# Patient Record
(demographics unavailable — no encounter records)

---

## 2024-11-14 NOTE — DISCUSSION/SUMMARY
[EKG obtained to assist in diagnosis and management of assessed problem(s)] : EKG obtained to assist in diagnosis and management of assessed problem(s) [FreeTextEntry1] : 1.  No additional cardiac testing at this time. 2.  He will follow-up with sleep medicine regarding the issues he is having with his CPAP machine.  I enforced upon him the importance of wearing it.  I have again given him their information so he can call and make an appointment. 3.  Continue current cardiac meds in doses as noted above for his dyslipidemia including atorvastatin 40 mg daily.  4.  Follow-up with his primary for hypothyroidism. 5.  Patient encouraged to work on diet to lose weight.   6.  Patient encouraged to work on a healthy diet high in lean protein, whole grains and vegetables, and lower in white flour and simple sugars. 7.  Patient is encouraged to exercise at least 30 minutes a day everyday of the week. 8.  Discussed with the patient for greater than 3 minutes about the need for smoking cessation. 9.  Follow up here in 6 months.

## 2024-11-14 NOTE — ASSESSMENT
[FreeTextEntry1] : Holter monitor performed August 1, 2018 demonstrated a sinus rhythm with an average heart rate 82 beats a minute, maximum 130, minimum 39 beats per minute. Intermittent first-degree heart block was observed. There were several sinus pauses, the longest of which lasted 5.7 seconds occurring while the patient sleep. He reported no significant symptoms during the Holter.  Echocardiogram September 5, 2019 demonstrates left ventricle normal in size and function ejection fraction of 55-60%. Right ventricle is borderline enlarged with normal function. No significant valvulopathy. Ejection fraction now appears normal and RV size appears smaller.  Echocardiogram October 25, 2021 demonstrated left ventricle normal in size with low normal function and an ejection fraction of 50 to 55%. Mildly enlarged right ventricle with normal function. Mild pulmonic regurgitation. No other significant valvular abnormality.  Holter monitor October 14, 2021 demonstrated a presenting rhythm was sinus with an average heart rate of 78 beats minute, maximum 142, minimum 37 bpm. 5 total PVCs were noted. No other significant arrhythmia.  Sleep study October 17, 2021 showed severe sleep apnea with an AHI of 76.9 events per hour and a eedl oxygen saturation of 73%. 60.3% of the study was spent with a saturation below 90%.  Echocardiogram April 23, 2024 demonstrates left ventricle normal size with low normal function and ejection fraction of 50%.  Mildly large right ventricle with borderline function.  No significant valve disease.  EKG: Sinus rhythm with no significant ST or T wave changes.  46 year-old man with a past medical history of schizophrenia, dyslipidemia, severe sleep apnea, active smoker who presents today for followup.  I once again strongly encouraged him to follow-up with sleep medicine and figure out how he can use the CPAP machine to treat his sleep apnea.  Thankfully he remains asymptomatic.  EKG today is unremarkable.  Cholesterol back in May was very well-controlled and he will continue on his current medications.  He is once again encouraged with regard to diet and exercise and the need to lose weight.

## 2024-11-14 NOTE — PHYSICAL EXAM
[General Appearance - In No Acute Distress] : no acute distress [Normal Conjunctiva] : the conjunctiva exhibited no abnormalities [Normal Oral Mucosa] : normal oral mucosa [Auscultation Breath Sounds / Voice Sounds] : lungs were clear to auscultation bilaterally [Abdomen Soft] : soft [Abdomen Tenderness] : non-tender [Abnormal Walk] : normal gait [Nail Clubbing] : no clubbing of the fingernails [Cyanosis, Localized] : no localized cyanosis [Skin Color & Pigmentation] : normal skin color and pigmentation [FreeTextEntry1] : Cardiac: Normal S1 and split S2, no S3, no S4. No audible murmurs or rubs. Regular rate and rhythm.

## 2024-11-14 NOTE — HISTORY OF PRESENT ILLNESS
[FreeTextEntry1] : Patient presents back to the office today feeling well and offering no specific complaints.  He still is not using his BiPAP really at all.  He never saw pulmonary because he says his mom told him not to go there.  The reason for that is unclear.  He continues on his antihyperlipidemic medication without a problem.  Patient denies chest pain, shortness of breath, palpitations, orthopnea, presyncope, syncope.

## 2025-05-05 NOTE — ASSESSMENT
[FreeTextEntry1] : Holter monitor performed August 1, 2018 demonstrated a sinus rhythm with an average heart rate 82 beats a minute, maximum 130, minimum 39 beats per minute. Intermittent first-degree heart block was observed. There were several sinus pauses, the longest of which lasted 5.7 seconds occurring while the patient sleep. He reported no significant symptoms during the Holter.  Echocardiogram September 5, 2019 demonstrates left ventricle normal in size and function ejection fraction of 55-60%. Right ventricle is borderline enlarged with normal function. No significant valvulopathy. Ejection fraction now appears normal and RV size appears smaller.  Echocardiogram October 25, 2021 demonstrated left ventricle normal in size with low normal function and an ejection fraction of 50 to 55%. Mildly enlarged right ventricle with normal function. Mild pulmonic regurgitation. No other significant valvular abnormality.  Holter monitor October 14, 2021 demonstrated a presenting rhythm was sinus with an average heart rate of 78 beats minute, maximum 142, minimum 37 bpm. 5 total PVCs were noted. No other significant arrhythmia.  Sleep study October 17, 2021 showed severe sleep apnea with an AHI of 76.9 events per hour and a edel oxygen saturation of 73%. 60.3% of the study was spent with a saturation below 90%.  Echocardiogram April 23, 2024 demonstrates left ventricle normal size with low normal function and ejection fraction of 50%.  Mildly enlarged right ventricle with borderline function.  No significant valve disease.  EKG: Sinus rhythm with no significant ST or T wave changes.  47 year-old man with a past medical history of schizophrenia, dyslipidemia, severe sleep apnea, active smoker who presents today for followup.  I once again strongly encouraged him to follow-up with sleep medicine and figure out how he can use the CPAP machine to treat his sleep apnea.  He says he will do so. Thankfully he remains asymptomatic.  EKG today is unremarkable.  He is once again encouraged with regard to diet and exercise and the need to lose weight. We also talked about his smoking and he says he does want to quit and he is going to work on that.

## 2025-05-05 NOTE — DISCUSSION/SUMMARY
[FreeTextEntry1] : 1.  No additional cardiac testing at this time. 2.  He will follow-up with sleep medicine regarding the issues he is having with his CPAP machine.  I enforced upon him the importance of wearing it.  I have again given him their information so he can call and make an appointment. 3.  Continue current cardiac meds in doses as noted above for his dyslipidemia including atorvastatin 40 mg daily.  4.  Follow-up with his primary for hypothyroidism. 5.  Patient encouraged to work on diet to lose weight.   6.  Patient encouraged to work on a healthy diet high in lean protein, whole grains and vegetables, and lower in white flour and simple sugars. 7.  Patient is encouraged to exercise at least 30 minutes a day everyday of the week. 8.  Discussed with the patient for greater than 3 minutes about the need for smoking cessation. 9.  He will have blood work done. 10.  Follow up here in 6 months. [EKG obtained to assist in diagnosis and management of assessed problem(s)] : EKG obtained to assist in diagnosis and management of assessed problem(s)